# Patient Record
Sex: FEMALE | Race: WHITE | NOT HISPANIC OR LATINO | URBAN - METROPOLITAN AREA
[De-identification: names, ages, dates, MRNs, and addresses within clinical notes are randomized per-mention and may not be internally consistent; named-entity substitution may affect disease eponyms.]

---

## 2023-10-20 PROBLEM — E66.9 OBESITY: Status: ACTIVE | Noted: 2023-10-20

## 2023-10-20 PROBLEM — G47.33 OBSTRUCTIVE SLEEP APNEA: Status: ACTIVE | Noted: 2023-10-20

## 2023-10-20 PROBLEM — R40.0 DAYTIME SOMNOLENCE: Status: ACTIVE | Noted: 2023-10-20

## 2023-10-20 PROBLEM — M62.89 TENSOR FASCIA LATA SYNDROME: Status: ACTIVE | Noted: 2023-10-20

## 2023-10-20 PROBLEM — K21.9 CHRONIC GERD: Status: ACTIVE | Noted: 2023-10-20

## 2023-10-20 PROBLEM — N39.3 URINARY, INCONTINENCE, STRESS FEMALE: Status: ACTIVE | Noted: 2023-10-20

## 2023-10-20 PROBLEM — E55.9 VITAMIN D DEFICIENCY: Status: ACTIVE | Noted: 2023-10-20

## 2023-10-20 PROBLEM — E78.5 HYPERLIPIDEMIA: Status: ACTIVE | Noted: 2023-10-20

## 2023-10-20 PROBLEM — M76.30 ILIOTIBIAL BAND SYNDROME: Status: ACTIVE | Noted: 2023-10-20

## 2023-10-20 RX ORDER — ACETAMINOPHEN, ASPIRIN (NSAID), AND CAFFEINE 250; 250; 65 MG/1; MG/1; MG/1
2 TABLET, FILM COATED ORAL DAILY
COMMUNITY

## 2023-10-20 RX ORDER — OMEPRAZOLE 40 MG/1
1 CAPSULE, DELAYED RELEASE ORAL DAILY
COMMUNITY
Start: 2022-04-13

## 2023-10-20 RX ORDER — CALCIUM CARBONATE 200(500)MG
1 TABLET,CHEWABLE ORAL DAILY
COMMUNITY

## 2023-10-23 ENCOUNTER — OFFICE VISIT (OUTPATIENT)
Dept: OBSTETRICS AND GYNECOLOGY | Facility: HOSPITAL | Age: 38
End: 2023-10-23
Payer: COMMERCIAL

## 2023-10-23 VITALS
BODY MASS INDEX: 41.02 KG/M2 | SYSTOLIC BLOOD PRESSURE: 143 MMHG | DIASTOLIC BLOOD PRESSURE: 96 MMHG | HEIGHT: 71 IN | WEIGHT: 293 LBS

## 2023-10-23 DIAGNOSIS — Z01.419 WELL WOMAN EXAM: ICD-10-CM

## 2023-10-23 DIAGNOSIS — N39.3 STRESS INCONTINENCE: ICD-10-CM

## 2023-10-23 DIAGNOSIS — N39.3 URINARY, INCONTINENCE, STRESS FEMALE: ICD-10-CM

## 2023-10-23 DIAGNOSIS — Z11.51 ENCOUNTER FOR SCREENING FOR HUMAN PAPILLOMAVIRUS (HPV): Primary | ICD-10-CM

## 2023-10-23 DIAGNOSIS — Z12.4 CERVICAL CANCER SCREENING: ICD-10-CM

## 2023-10-23 PROCEDURE — 1036F TOBACCO NON-USER: CPT | Performed by: OBSTETRICS & GYNECOLOGY

## 2023-10-23 PROCEDURE — 99385 PREV VISIT NEW AGE 18-39: CPT | Performed by: OBSTETRICS & GYNECOLOGY

## 2023-10-23 PROCEDURE — 87624 HPV HI-RISK TYP POOLED RSLT: CPT | Performed by: OBSTETRICS & GYNECOLOGY

## 2023-10-23 PROCEDURE — 88175 CYTOPATH C/V AUTO FLUID REDO: CPT | Mod: TC | Performed by: OBSTETRICS & GYNECOLOGY

## 2023-10-23 ASSESSMENT — PATIENT HEALTH QUESTIONNAIRE - PHQ9
2. FEELING DOWN, DEPRESSED OR HOPELESS: NOT AT ALL
1. LITTLE INTEREST OR PLEASURE IN DOING THINGS: NOT AT ALL
SUM OF ALL RESPONSES TO PHQ9 QUESTIONS 1 AND 2: 0

## 2023-10-23 NOTE — PROGRESS NOTES
"HPI: Celeste Philippe is a 38 y.o.  here for an annual physical exam.      OB history:    - CS x1,  x1    Gyn history:   - Menstrual: menses monthly most of the time, skips a period once a year; lighter flow than before having kids, associated pain manageable; last for 5-6 days   - Menarche: 12 years old   - Denies sexual concerns   - Contraception: partner s/p vasectomy   - Declines STI testing  - Endorses stress incontinence, amenable to pelvic floor PT      Screening:  - DV: safe at home   - Exercise, diet: denies concerns   - COVID/flu vaccine: s/p COVID booster, declines flu vaccine today   - Pap: collected today      The patient's past medical, surgical, family and social histories were updated as indicated.    Objective:  Visit Vitals  BP (!) 143/96   Ht 1.803 m (5' 11\")   Wt (!) 168 kg (370 lb)   LMP 10/06/2023   BMI 51.60 kg/m²   OB Status Having periods   Smoking Status Never   BSA 2.9 m²      Physical Exam  Constitutional:       Appearance: Normal appearance.   Genitourinary:      Vulva and urethral meatus normal.      Right Labia: No rash.     Left Labia: No rash.       Right Adnexa: not tender, not full and no mass present.     Left Adnexa: not tender, not full and no mass present.     No cervical motion tenderness or lesion.      Uterus is not tender.   Breasts:     Breasts are symmetrical.      Right: No mass, nipple discharge, skin change or tenderness.      Left: No mass, nipple discharge, skin change or tenderness.   HENT:      Head: Normocephalic and atraumatic.      Nose: Nose normal.   Eyes:      Extraocular Movements: Extraocular movements intact.   Cardiovascular:      Rate and Rhythm: Normal rate.   Pulmonary:      Effort: Pulmonary effort is normal.   Musculoskeletal:         General: Normal range of motion.      Cervical back: Normal range of motion.   Neurological:      General: No focal deficit present.      Mental Status: She is alert.   Skin:     General: Skin is warm and " dry.   Psychiatric:         Behavior: Behavior normal.       ASSESSMENT & PLAN  Celeste Philippe is a 38 y.o.  here for annnual     Urinary, incontinence, stress female  Pelvic floor PT referral made     Well woman exam  Pap collected  Mammogram next year      Orders Placed This Encounter   Procedures    Referral to Physical Therapy     Standing Status:   Future     Standing Expiration Date:   2024     Referral Priority:   Routine     Referral Type:   Consultation     Referral Reason:   Specialty Services Required     Requested Specialty:   Physical Therapy     Number of Visits Requested:   1      RTC in 1 year or PRN    Seen and discussed with Dr. Priscilla Plata MD

## 2023-11-02 ENCOUNTER — OFFICE VISIT (OUTPATIENT)
Dept: PRIMARY CARE | Facility: CLINIC | Age: 38
End: 2023-11-02
Payer: COMMERCIAL

## 2023-11-02 VITALS
SYSTOLIC BLOOD PRESSURE: 139 MMHG | WEIGHT: 293 LBS | HEART RATE: 92 BPM | BODY MASS INDEX: 51.47 KG/M2 | DIASTOLIC BLOOD PRESSURE: 78 MMHG

## 2023-11-02 DIAGNOSIS — R03.0 ELEVATED BLOOD PRESSURE READING: Primary | ICD-10-CM

## 2023-11-02 PROCEDURE — 99213 OFFICE O/P EST LOW 20 MIN: CPT | Performed by: INTERNAL MEDICINE

## 2023-11-02 PROCEDURE — 1036F TOBACCO NON-USER: CPT | Performed by: INTERNAL MEDICINE

## 2023-11-02 NOTE — PROGRESS NOTES
Subjective   Patient ID: Celeste Philippe is a 38 y.o. female who presents for Hypertension.    HPI     Patient is a 38-year-old female with past medical history of obesity who presents with chief complaint of elevated blood pressure readings.  She was at the gynecologist and her systolic blood pressure was 146.  She does not have a history of hypertension.  She does not plan to check her blood pressure at home.  Her blood pressure today is 139/78.  No headaches changes in vision orthopnea.  She does not take any blood pressure medications.  She is for the most part sedentary and does not get much exercise.    Review of Systems  Constitutional: No fever or chills  Cardiovascular: no chest pain, no palpitations and no syncope.   Respiratory: no cough, no shortness of breath during exertion and no shortness of breath at rest.   Gastrointestinal: no abdominal pain, no nausea and no vomiting.  Neuro: No Headache, no dizziness    Objective   /78   Pulse 92   Wt (!) 167 kg (369 lb)   LMP 10/06/2023   BMI 51.47 kg/m²     Physical Exam  Constitutional: Alert and in no acute distress. Well developed, well nourished  Head and Face: Head and face: Normal.    Cardiovascular: Heart rate and rhythm were normal, normal S1 and S2. No peripheral edema.   Pulmonary: No respiratory distress. Clear bilateral breath sounds.  Musculoskeletal: Gait and station: Normal. Muscle strength/tone: Normal.   Skin: Normal skin color and pigmentation, normal skin turgor, and no rash.    Psychiatric: Judgment and insight: Intact. Mood and affect: Normal.        Lab Results   Component Value Date    WBC 7.6 10/28/2021    HGB 14.2 10/28/2021    HCT 45.2 (H) 10/28/2021     10/28/2021    CHOL 203 (H) 10/28/2021    TRIG 148 10/28/2021    HDL 42 (L) 10/28/2021    ALT 16 10/28/2021    AST 14 10/28/2021     10/28/2021    K 4.1 10/28/2021     10/28/2021    CREATININE 0.8 10/28/2021    BUN 7 (L) 10/28/2021    CO2 23 (L) 10/28/2021     TSH 1.94 10/28/2021    INR 0.9 10/28/2021    HGBA1C 5.0 10/28/2021       No image results found.            Assessment/Plan   Problem List Items Addressed This Visit             ICD-10-CM    Elevated blood pressure reading - Primary R03.0

## 2023-11-02 NOTE — ASSESSMENT & PLAN NOTE
While it is likely that she does have hypertension would give it a little bit more time before making an official diagnosis.  Recommend getting an [employer] blood pressure cuff and check it once in the morning daily for 2 weeks.  Please return to clinic in 2 weeks for reevaluation.  Advised regular exercise weight reduction and low-salt diet.  At that time if the blood pressure is still elevated we will need to consider starting a blood pressure medications

## 2023-11-08 LAB
CYTOLOGY CMNT CVX/VAG CYTO-IMP: NORMAL
LAB AP HPV GENOTYPE QUESTION: YES
LAB AP HPV HR: NORMAL
LABORATORY COMMENT REPORT: NORMAL
LMP START DATE: NORMAL
PATH REPORT.TOTAL CANCER: NORMAL

## 2023-11-13 LAB
HPV HR 12 DNA GENITAL QL NAA+PROBE: NEGATIVE
HPV HR GENOTYPES PNL CVX NAA+PROBE: NEGATIVE
HPV16 DNA SPEC QL NAA+PROBE: NEGATIVE
HPV18 DNA SPEC QL NAA+PROBE: NEGATIVE

## 2023-11-16 ENCOUNTER — OFFICE VISIT (OUTPATIENT)
Dept: PRIMARY CARE | Facility: CLINIC | Age: 38
End: 2023-11-16
Payer: COMMERCIAL

## 2023-11-16 VITALS — SYSTOLIC BLOOD PRESSURE: 136 MMHG | DIASTOLIC BLOOD PRESSURE: 84 MMHG | HEART RATE: 94 BPM

## 2023-11-16 DIAGNOSIS — R03.0 ELEVATED BLOOD PRESSURE READING: ICD-10-CM

## 2023-11-16 PROCEDURE — 99213 OFFICE O/P EST LOW 20 MIN: CPT | Performed by: INTERNAL MEDICINE

## 2023-11-16 PROCEDURE — 1036F TOBACCO NON-USER: CPT | Performed by: INTERNAL MEDICINE

## 2023-11-16 NOTE — ASSESSMENT & PLAN NOTE
At this point considering improvement in blood pressure and the fact that the blood pressure at home is less than 130.  We will hold off on starting antihypertensive medications however we will recheck the blood pressure every 6 months.  Advised continue with ambulatory blood pressure monitoring and if blood pressure consistently increases to greater than 130 then please return to clinic for reevaluation.  Encourage weight reduction.

## 2023-11-16 NOTE — PROGRESS NOTES
Subjective   Patient ID: Celeste Philippe is a 38 y.o. female who presents for Follow-up.    HPI     Patient is a 38-year-old female with past medical history of obesity who presents with chief complaint of elevated blood pressure readings.  She was at the gynecologist and her systolic blood pressure was 146.  She does not have a history of hypertension.  She does not plan to check her blood pressure at home.  Her systolic blood pressure is 136.  She is consistently taking her blood pressure at home and its consistently less than 130 systolic.  No headaches changes in vision orthopnea.    Review of Systems  Constitutional: No fever or chills  Cardiovascular: no chest pain, no palpitations and no syncope.   Respiratory: no cough, no shortness of breath during exertion and no shortness of breath at rest.   Gastrointestinal: no abdominal pain, no nausea and no vomiting.  Neuro: No Headache, no dizziness    Objective   /84   Pulse 94   LMP 10/06/2023     Physical Exam  Constitutional: Alert and in no acute distress. Well developed, well nourished  Head and Face: Head and face: Normal.    Cardiovascular: Heart rate and rhythm were normal, normal S1 and S2. No peripheral edema.   Pulmonary: No respiratory distress. Clear bilateral breath sounds.  Musculoskeletal: Gait and station: Normal. Muscle strength/tone: Normal.   Skin: Normal skin color and pigmentation, normal skin turgor, and no rash.    Psychiatric: Judgment and insight: Intact. Mood and affect: Normal.        Lab Results   Component Value Date    WBC 7.6 10/28/2021    HGB 14.2 10/28/2021    HCT 45.2 (H) 10/28/2021     10/28/2021    CHOL 203 (H) 10/28/2021    TRIG 148 10/28/2021    HDL 42 (L) 10/28/2021    ALT 16 10/28/2021    AST 14 10/28/2021     10/28/2021    K 4.1 10/28/2021     10/28/2021    CREATININE 0.8 10/28/2021    BUN 7 (L) 10/28/2021    CO2 23 (L) 10/28/2021    TSH 1.94 10/28/2021    INR 0.9 10/28/2021    HGBA1C 5.0 10/28/2021        No image results found.            Assessment/Plan   Problem List Items Addressed This Visit             ICD-10-CM    Elevated blood pressure reading R03.0     At this point considering improvement in blood pressure and the fact that the blood pressure at home is less than 130.  We will hold off on starting antihypertensive medications however we will recheck the blood pressure every 6 months.  Advised continue with ambulatory blood pressure monitoring and if blood pressure consistently increases to greater than 130 then please return to clinic for reevaluation.  Encourage weight reduction.

## 2024-09-29 ENCOUNTER — OFFICE VISIT (OUTPATIENT)
Dept: URGENT CARE | Age: 39
End: 2024-09-29
Payer: COMMERCIAL

## 2024-09-29 VITALS
DIASTOLIC BLOOD PRESSURE: 76 MMHG | WEIGHT: 293 LBS | TEMPERATURE: 98.5 F | BODY MASS INDEX: 41.02 KG/M2 | OXYGEN SATURATION: 97 % | HEART RATE: 97 BPM | SYSTOLIC BLOOD PRESSURE: 142 MMHG | RESPIRATION RATE: 18 BRPM | HEIGHT: 71 IN

## 2024-09-29 DIAGNOSIS — S23.429A: Primary | ICD-10-CM

## 2024-09-29 PROCEDURE — 99204 OFFICE O/P NEW MOD 45 MIN: CPT | Performed by: PHYSICIAN ASSISTANT

## 2024-09-29 PROCEDURE — 3008F BODY MASS INDEX DOCD: CPT | Performed by: PHYSICIAN ASSISTANT

## 2024-09-29 PROCEDURE — 1036F TOBACCO NON-USER: CPT | Performed by: PHYSICIAN ASSISTANT

## 2024-09-29 RX ORDER — NAPROXEN 500 MG/1
500 TABLET ORAL 2 TIMES DAILY PRN
Qty: 20 TABLET | Refills: 0 | Status: SHIPPED | OUTPATIENT
Start: 2024-09-29 | End: 2024-10-09

## 2024-09-29 ASSESSMENT — PAIN SCALES - GENERAL: PAINLEVEL: 6

## 2024-09-29 NOTE — PROGRESS NOTES
"Subjective   Patient ID: Celeste Philippe is a 39 y.o. female. They present today with a chief complaint of Pain With Breathing (Pain on left side of chest w/ breathing started friday).    History of Present Illness  Reports bending over in the shower 2 days ago, felt a pop in her lower chest, and has had intermittent pain since. Pain is worse with bending forward or twisting, but states he can position herself where she is pain free. Denies SOB, other CP, cough, direct trauma.          Past Medical History  Allergies as of 2024 - Reviewed 2024   Allergen Reaction Noted    Penicillins Hives 10/20/2023       (Not in a hospital admission)       No past medical history on file.    Past Surgical History:   Procedure Laterality Date    OTHER SURGICAL HISTORY  2021    Cholecystectomy    OTHER SURGICAL HISTORY  2021     section        reports that she has never smoked. She has never used smokeless tobacco. She reports that she does not drink alcohol and does not use drugs.    Review of Systems  Pertinent systems reviewed and were negative unless otherwise stated in HPI.    Objective    Vitals:    24 1333   BP: 142/76   BP Location: Left arm   Patient Position: Sitting   BP Cuff Size: Large adult   Pulse: 97   Resp: 18   Temp: 36.9 °C (98.5 °F)   TempSrc: Oral   SpO2: 97%   Weight: (!) 170 kg (375 lb)   Height: 1.803 m (5' 11\")     No LMP recorded.    Physical Exam  Constitutional:       General: She is not in acute distress.  HENT:      Nose: No congestion.      Mouth/Throat:      Mouth: Mucous membranes are moist.   Eyes:      Conjunctiva/sclera: Conjunctivae normal.   Cardiovascular:      Rate and Rhythm: Normal rate and regular rhythm.      Heart sounds: Normal heart sounds.   Pulmonary:      Effort: Pulmonary effort is normal. No respiratory distress.      Breath sounds: Normal breath sounds.   Skin:     Findings: No rash.         Diagnostic study results (if any) were reviewed by " Matias Siddiqui PA-C.    Assessment/Plan   Allergies, medications, history, and pertinent labs/EKGs/imaging reviewed by Matias Siddiqui PA-C.     Medical Decision Making  Low concern for PE, MI, aortic dissection. Clear mechanism of injury, positional, non-exertional. MSK etiology most likely    Orders and Diagnoses  Diagnoses and all orders for this visit:  Sternum sprain, initial encounter      Medical Admin Record      Disposition: Home    Electronically signed by Matias Siddiqui PA-C

## 2025-07-24 ENCOUNTER — APPOINTMENT (OUTPATIENT)
Dept: PRIMARY CARE | Facility: CLINIC | Age: 40
End: 2025-07-24
Payer: COMMERCIAL

## 2025-07-24 VITALS
OXYGEN SATURATION: 97 % | WEIGHT: 293 LBS | HEIGHT: 71 IN | BODY MASS INDEX: 41.02 KG/M2 | SYSTOLIC BLOOD PRESSURE: 138 MMHG | DIASTOLIC BLOOD PRESSURE: 77 MMHG | HEART RATE: 82 BPM

## 2025-07-24 DIAGNOSIS — E66.813 CLASS 3 SEVERE OBESITY WITH BODY MASS INDEX (BMI) OF 50.0 TO 59.9 IN ADULT, UNSPECIFIED OBESITY TYPE, UNSPECIFIED WHETHER SERIOUS COMORBIDITY PRESENT: ICD-10-CM

## 2025-07-24 DIAGNOSIS — Z12.31 SCREENING MAMMOGRAM FOR BREAST CANCER: ICD-10-CM

## 2025-07-24 DIAGNOSIS — Z86.69 HISTORY OF RECURRENT EAR INFECTION: Primary | ICD-10-CM

## 2025-07-24 DIAGNOSIS — Z00.00 HEALTH CARE MAINTENANCE: ICD-10-CM

## 2025-07-24 PROBLEM — R03.0 ELEVATED BLOOD PRESSURE READING: Status: RESOLVED | Noted: 2023-11-02 | Resolved: 2025-07-24

## 2025-07-24 PROBLEM — I10 PRIMARY HYPERTENSION: Status: ACTIVE | Noted: 2025-07-24

## 2025-07-24 PROCEDURE — 3008F BODY MASS INDEX DOCD: CPT | Performed by: INTERNAL MEDICINE

## 2025-07-24 PROCEDURE — 3075F SYST BP GE 130 - 139MM HG: CPT | Performed by: INTERNAL MEDICINE

## 2025-07-24 PROCEDURE — 99396 PREV VISIT EST AGE 40-64: CPT | Performed by: INTERNAL MEDICINE

## 2025-07-24 PROCEDURE — 3078F DIAST BP <80 MM HG: CPT | Performed by: INTERNAL MEDICINE

## 2025-07-24 PROCEDURE — 1036F TOBACCO NON-USER: CPT | Performed by: INTERNAL MEDICINE

## 2025-07-24 ASSESSMENT — PATIENT HEALTH QUESTIONNAIRE - PHQ9
1. LITTLE INTEREST OR PLEASURE IN DOING THINGS: NOT AT ALL
2. FEELING DOWN, DEPRESSED OR HOPELESS: NOT AT ALL
SUM OF ALL RESPONSES TO PHQ9 QUESTIONS 1 AND 2: 0

## 2025-07-24 NOTE — PATIENT INSTRUCTIONS
We kindly ask that you take the lead and scheduling your referral appointments to ensure they align best with your availability by calling [phone].  For laboratory tests we encourage you to schedule an appointment online [email or web address] but walk-ins are available as well.  For radiology testing you can call [phone] or [phone] to schedule.  If for any reason you are having difficulty scheduling your appointments please feel free to reach out at our office by calling [phone] to assist further

## 2025-07-24 NOTE — PROGRESS NOTES
"Subjective   Patient ID: Celeste Philippe is a 40 y.o. female who presents for Annual Exam.          HPI     Patient is a 40-year-old female with past medical history of hypertension and obesity presents for wellness.  No specific complaints at this time other than the obesity.  She has followed with bariatric medicine in the past but was reluctant to do so due to concerns about the surgery.  She continues to be have a BMI of greater than 50.  Her life is mostly sedentary working from home.  She works for [employer].  She has tried exercising but it hurts her knees.  She tries eliminating excessive amounts of calories but no improvement in the weight overall.  She is interested in trying a GLP-1.    Denies illicit substances smoking or alcohol.        Review of Systems  Constitutional: No fever or chills, No Night Sweats  Eyes: No Blurry Vision or Eye sight problems  ENT: No Nasal Discharge, Hoarseness, sore throat  Cardiovascular: no chest pain, no palpitations and no syncope.   Respiratory: no cough, no shortness of breath during exertion and no shortness of breath at rest.   Gastrointestinal: no abdominal pain, no nausea and no vomiting.   : No vaginal discharge, burning with urination, no blood in urine or stools  Skin: No Skin rashes or Lesions  Neuro: No Headache, no dizziness or Numbness or tingling  Psych: No Anxiety, depression or sleeping problems  Heme: No Easy bleeding or brusing.     Objective   /77   Pulse 82   Ht 1.803 m (5' 11\")   Wt (!) 168 kg (370 lb)   SpO2 97%   BMI 51.60 kg/m²     Physical Exam  Constitutional: Alert and in no acute distress. Well developed, well nourished.   Head and Face: Head and face: Normal.    Eyes: Normal external exam. Pupils were equal in size, round, reactive to light (PERRL) with normal accommodation and extraocular movements intact (EOMI).   Ears, Nose, Mouth, and Throat: External inspection of ears and nose: Normal.  Hearing: Normal.  Nasal mucosa, " septum, and turbinates: Normal.  Lips, teeth, and gums: Normal.  Oropharynx: Normal.   Neck: No neck mass was observed. Supple. Thyroid not enlarged and there were no palpable thyroid nodules.   Cardiovascular: Heart rate and rhythm were normal, normal S1 and S2. Pedal pulses: Normal. No peripheral edema.   Pulmonary: No respiratory distress. Clear bilateral breath sounds.   Breast: Normal Appearance, No Masses or lumps palpated  Abdomen: Soft nontender; no abdominal mass palpated. Normal bowel sounds. No organomegaly.   : Deferred   Musculoskeletal: No joint swelling seen, normal movements of all extremities. Range of motion: Normal.  Muscle strength/tone: Normal.    Skin: Normal skin color and pigmentation, normal skin turgor, and no rash.   Neurologic: Deep tendon reflexes were 2+ and symmetric.   Psychiatric: Judgment and insight: Intact. Mood and affect: Normal.  Lymphatic: No cervical lymphadenopathy. Palpation of lymph nodes in axillae: Normal.  Palpation of lymph nodes in groin: Normal.    Lab Results   Component Value Date    WBC 7.6 10/28/2021    HGB 14.2 10/28/2021    HCT 45.2 (H) 10/28/2021     10/28/2021    CHOL 203 (H) 10/28/2021    TRIG 148 10/28/2021    HDL 42 (L) 10/28/2021    ALT 16 10/28/2021    AST 14 10/28/2021     10/28/2021    K 4.1 10/28/2021     10/28/2021    CREATININE 0.8 10/28/2021    BUN 7 (L) 10/28/2021    CO2 23 (L) 10/28/2021    TSH 1.94 10/28/2021    INR 0.9 10/28/2021    HGBA1C 5.0 10/28/2021       No image results found.      Assessment/Plan   Problem List Items Addressed This Visit           ICD-10-CM    Obesity E66.9    Relevant Medications    tirzepatide, weight loss, (Zepbound) 2.5 mg/0.5 mL injection    Other Relevant Orders    Referral to Endocrinology     Other Visit Diagnoses         Codes      History of recurrent ear infection    -  Primary Z86.69    Relevant Orders    Referral to ENT      Health care maintenance     Z00.00    Relevant Orders    CBC     Comprehensive metabolic panel    Lipid Panel    Lipoprotein A (LPA)    TSH with reflex to Free T4 if abnormal          Given the significant obesity likely impairing her ability to exercise we will see if GLP-1 is covered.  Will trial Zepbound.  Follow-up 3 months.  May need prior authorization.  Consider bariatric surgery    Referral to ENT given recurrent ear infections over the last year.  Check screening labs  Mammogram ordered    Dear Celeste Philippe     It was my pleasure to take care of you today in the office. Below are the things we discussed today:    1. 1. Immunizations: Yearly Flu shot is recommended.       2. Blood Work: Ordered  3. Seen your dentist twice a year  4. Yearly Eye exam is recommended    5. BMI: 51.6  6: Diet recommendations:   Eat Clean, Try to have as many home cooked meals as possible  Avoid processed foods which contain excess calories, sugar, and sodium.    7. Exercise recommendations:   150 minutes a week to maintain your weight     If you have to loose weight, you need a better diet and exercise plan.     8. Supplements recommended:  a - Calcium 600 mg up to twice a day to get a total of 1200 mg. Each 8 oz of milk or yogurt or 1 oz of cheese, 1 Banana, 1 serving of green Leafy vegetable has about 300 mg of Calcium, so you may subtract that amount. Calcium citrate is the only acceptable supplement to take if you take an acid suppressing medication like Prilosec; otherwise Calcium carbonate is acceptable too (It can cause Constipation).   b - Vitamin D - 2000 IU daily     9. Please get your Living will / Advance directive completed if you do not have one already. Please make sure our office has a copy of the latest one.     10. Colonoscopy: Uptodate, repeat in   11. Mammogram: Start at age  12. PAP: Follow-up with gynecology  13. DEXA: N/A  14: Skin Check: Please see Dermatology once a year for a Skin Check.     15.  Follow-up 3 months    Follow up in one year for a Physical. Please  call the office before your Physical to see if you need blood work completed prior to your physical.     Please call me if any questions arise from now until your next visit. I will call you after I am done seeing patients. A Doctor is always available by phone when the office is closed. Please feel free to call for help with any problem that you feel shouldn't wait until the office re-opens.

## 2025-07-25 RX ORDER — TIRZEPATIDE 2.5 MG/.5ML
2.5 INJECTION, SOLUTION SUBCUTANEOUS
Qty: 2 ML | Refills: 0 | Status: SHIPPED | OUTPATIENT
Start: 2025-07-27

## 2025-07-27 ENCOUNTER — APPOINTMENT (OUTPATIENT)
Dept: RADIOLOGY | Facility: HOSPITAL | Age: 40
End: 2025-07-27
Payer: COMMERCIAL

## 2025-07-27 ENCOUNTER — HOSPITAL ENCOUNTER (EMERGENCY)
Facility: HOSPITAL | Age: 40
Discharge: HOME | End: 2025-07-27
Payer: COMMERCIAL

## 2025-07-27 VITALS
BODY MASS INDEX: 41.02 KG/M2 | HEIGHT: 71 IN | RESPIRATION RATE: 18 BRPM | WEIGHT: 293 LBS | HEART RATE: 90 BPM | TEMPERATURE: 96.8 F | SYSTOLIC BLOOD PRESSURE: 146 MMHG | DIASTOLIC BLOOD PRESSURE: 86 MMHG | OXYGEN SATURATION: 95 %

## 2025-07-27 DIAGNOSIS — K64.8 INTERNAL HEMORRHOID: Primary | ICD-10-CM

## 2025-07-27 DIAGNOSIS — K56.41 FECAL IMPACTION (MULTI): ICD-10-CM

## 2025-07-27 PROCEDURE — 2500000001 HC RX 250 WO HCPCS SELF ADMINISTERED DRUGS (ALT 637 FOR MEDICARE OP): Performed by: PHYSICIAN ASSISTANT

## 2025-07-27 PROCEDURE — 74019 RADEX ABDOMEN 2 VIEWS: CPT | Mod: FOREIGN READ | Performed by: RADIOLOGY

## 2025-07-27 PROCEDURE — 74018 RADEX ABDOMEN 1 VIEW: CPT

## 2025-07-27 PROCEDURE — 2500000005 HC RX 250 GENERAL PHARMACY W/O HCPCS: Performed by: PHYSICIAN ASSISTANT

## 2025-07-27 PROCEDURE — 99283 EMERGENCY DEPT VISIT LOW MDM: CPT

## 2025-07-27 RX ORDER — LIDOCAINE 50 MG/G
OINTMENT TOPICAL AS NEEDED
Qty: 30 G | Refills: 0 | Status: SHIPPED | OUTPATIENT
Start: 2025-07-27 | End: 2026-07-27

## 2025-07-27 RX ORDER — LIDOCAINE 40 MG/G
CREAM TOPICAL ONCE
Status: COMPLETED | OUTPATIENT
Start: 2025-07-27 | End: 2025-07-27

## 2025-07-27 RX ORDER — HYDROCORTISONE ACETATE 25 MG/1
25 SUPPOSITORY RECTAL ONCE
Status: COMPLETED | OUTPATIENT
Start: 2025-07-27 | End: 2025-07-27

## 2025-07-27 RX ORDER — IBUPROFEN 600 MG/1
600 TABLET, FILM COATED ORAL EVERY 6 HOURS PRN
Qty: 28 TABLET | Refills: 0 | Status: SHIPPED | OUTPATIENT
Start: 2025-07-27 | End: 2025-08-03

## 2025-07-27 RX ORDER — DOCUSATE SODIUM 100 MG/1
100 CAPSULE, LIQUID FILLED ORAL ONCE
Status: COMPLETED | OUTPATIENT
Start: 2025-07-27 | End: 2025-07-27

## 2025-07-27 RX ORDER — POLYETHYLENE GLYCOL 3350 17 G/17G
17 POWDER, FOR SOLUTION ORAL DAILY
Qty: 500 G | Refills: 0 | Status: SHIPPED | OUTPATIENT
Start: 2025-07-27 | End: 2025-08-26

## 2025-07-27 RX ORDER — HYDROCORTISONE ACETATE 25 MG/1
25 SUPPOSITORY RECTAL 2 TIMES DAILY
Qty: 12 SUPPOSITORY | Refills: 0 | Status: SHIPPED | OUTPATIENT
Start: 2025-07-27 | End: 2025-08-02

## 2025-07-27 RX ORDER — IBUPROFEN 600 MG/1
600 TABLET, FILM COATED ORAL ONCE
Status: COMPLETED | OUTPATIENT
Start: 2025-07-27 | End: 2025-07-27

## 2025-07-27 RX ADMIN — DOCUSATE SODIUM 100 MG: 100 CAPSULE, LIQUID FILLED ORAL at 15:01

## 2025-07-27 RX ADMIN — LIDOCAINE 1 APPLICATION: 40 CREAM TOPICAL at 15:01

## 2025-07-27 RX ADMIN — HYDROCORTISONE ACETATE 25 MG: 25 SUPPOSITORY RECTAL at 15:01

## 2025-07-27 RX ADMIN — IBUPROFEN 600 MG: 600 TABLET ORAL at 15:01

## 2025-07-27 ASSESSMENT — PAIN DESCRIPTION - LOCATION: LOCATION: RECTUM

## 2025-07-27 ASSESSMENT — PAIN SCALES - GENERAL: PAINLEVEL_OUTOF10: 8

## 2025-07-27 ASSESSMENT — PAIN - FUNCTIONAL ASSESSMENT: PAIN_FUNCTIONAL_ASSESSMENT: 0-10

## 2025-07-27 ASSESSMENT — PAIN DESCRIPTION - DESCRIPTORS: DESCRIPTORS: THROBBING;PRESSURE

## 2025-07-27 ASSESSMENT — PAIN DESCRIPTION - PAIN TYPE: TYPE: ACUTE PAIN

## 2025-07-27 NOTE — ED TRIAGE NOTES
"Pt C/O rectal pain x 2 days. Pt states \"it feels like I'm trying to push out a golf ball but there is no golf ball\"  "

## 2025-07-27 NOTE — ED PROVIDER NOTES
HPI   Chief Complaint   Patient presents with    Rectal Pain       40-year-old female complains of pain in her rectum and states it feels like she is trying to push something out.  She denies any injury she denies history of hemorrhoids.  She had a hard stool day prior but has had some diarrhea type stool since.              Patient History   Medical History[1]  Surgical History[2]  Family History[3]  Social History[4]    Physical Exam   ED Triage Vitals [07/27/25 1345]   Temperature Heart Rate Respirations BP   36 °C (96.8 °F) 90 18 146/86      Pulse Ox Temp Source Heart Rate Source Patient Position   95 % Temporal -- --      BP Location FiO2 (%)     -- --       Physical Exam  Vitals reviewed. Exam conducted with a chaperone present (Meghan RN).   Constitutional:       General: She is not in acute distress.     Appearance: Normal appearance. She is normal weight. She is not ill-appearing, toxic-appearing or diaphoretic.   HENT:      Head: Normocephalic and atraumatic.      Right Ear: External ear normal.      Left Ear: External ear normal.      Nose: Nose normal.      Mouth/Throat:      Mouth: Mucous membranes are moist.     Eyes:      Extraocular Movements: Extraocular movements intact.      Conjunctiva/sclera: Conjunctivae normal.      Pupils: Pupils are equal, round, and reactive to light.       Cardiovascular:      Rate and Rhythm: Normal rate and regular rhythm.   Pulmonary:      Effort: Pulmonary effort is normal. No respiratory distress.      Breath sounds: No stridor.   Abdominal:      General: There is no distension.   Genitourinary:     Rectum: Normal.      Comments: Internal rectal exam with tenderness. Normal externally. Suspect fecal impaction and internal hemorrhoids since pain and swelling on sides of rectum but also hard stool in rectal vault.     Musculoskeletal:         General: No swelling or deformity.     Skin:     Capillary Refill: Capillary refill takes less than 2 seconds.      Findings:  No rash.     Neurological:      General: No focal deficit present.      Mental Status: She is alert and oriented to person, place, and time. Mental status is at baseline.     Psychiatric:         Mood and Affect: Mood normal.         Behavior: Behavior normal.         Thought Content: Thought content normal.         Judgment: Judgment normal.           ED Course & MDM   Diagnoses as of 25 1626   Internal hemorrhoid   Fecal impaction (Multi)                 No data recorded     Jamarcus Coma Scale Score: 15 (25 1505 : Karely Gonzales RN)                           Medical Decision Making  Differential diagnosis of fissures or hemorrhoid prolapse fecal impaction    My suspicion is or is both internal hemorrhoids and a fecal impaction I did obtain a x-ray that does show significant amount of stool in the rectal vault interpreted by me with no signs of bowel obstruction.    Given multiple medications for both hemorrhoid and to soften the stool as well as she can do a home Fleet enema referred to colorectal recommend to follow-up for any worse or concerning symptoms ultimately discharge    Risk  OTC drugs.  Prescription drug management.        Procedure  Procedures         [1] No past medical history on file.  [2]   Past Surgical History:  Procedure Laterality Date    OTHER SURGICAL HISTORY  2021    Cholecystectomy    OTHER SURGICAL HISTORY  2021     section   [3]   Family History  Problem Relation Name Age of Onset    Atrial fibrillation Father      Coronary artery disease Father      Transient ischemic attack Father     [4]   Social History  Tobacco Use    Smoking status: Never    Smokeless tobacco: Never   Vaping Use    Vaping status: Never Used   Substance Use Topics    Alcohol use: Never    Drug use: Never        Osbaldo Chu PA-C  [phone]

## 2025-07-28 ENCOUNTER — APPOINTMENT (OUTPATIENT)
Dept: SURGERY | Facility: CLINIC | Age: 40
End: 2025-07-28
Payer: COMMERCIAL

## 2025-07-28 ENCOUNTER — TELEPHONE (OUTPATIENT)
Dept: SURGERY | Facility: CLINIC | Age: 40
End: 2025-07-28
Payer: COMMERCIAL

## 2025-07-28 DIAGNOSIS — E66.813 CLASS 3 SEVERE OBESITY WITH BODY MASS INDEX (BMI) OF 50.0 TO 59.9 IN ADULT, UNSPECIFIED OBESITY TYPE, UNSPECIFIED WHETHER SERIOUS COMORBIDITY PRESENT: ICD-10-CM

## 2025-07-28 RX ORDER — TIRZEPATIDE 2.5 MG/.5ML
2.5 INJECTION, SOLUTION SUBCUTANEOUS
Qty: 2 ML | Refills: 0 | Status: SHIPPED | OUTPATIENT
Start: 2025-08-03

## 2025-07-28 NOTE — TELEPHONE ENCOUNTER
Left message for patient to return call regarding scheduling appointment with Dr. Jefferson. I have blocked off 8/21 at 11:30.

## 2025-08-01 ENCOUNTER — OFFICE VISIT (OUTPATIENT)
Dept: SURGERY | Facility: CLINIC | Age: 40
End: 2025-08-01
Payer: COMMERCIAL

## 2025-08-01 VITALS
DIASTOLIC BLOOD PRESSURE: 82 MMHG | HEART RATE: 75 BPM | BODY MASS INDEX: 51.74 KG/M2 | WEIGHT: 293 LBS | SYSTOLIC BLOOD PRESSURE: 120 MMHG

## 2025-08-01 DIAGNOSIS — K64.8 PROLAPSED INTERNAL HEMORRHOIDS: Primary | ICD-10-CM

## 2025-08-01 PROCEDURE — 99213 OFFICE O/P EST LOW 20 MIN: CPT | Mod: 25 | Performed by: NURSE PRACTITIONER

## 2025-08-01 PROCEDURE — 46600 DIAGNOSTIC ANOSCOPY SPX: CPT | Performed by: NURSE PRACTITIONER

## 2025-08-01 NOTE — PROGRESS NOTES
History Of Present Illness  Celeste Philippe is a 40 y.o. female presenting with large hemorrhoids.     She had constipation for 2 days and went to the ED d/t the anal pain.  They felt a large internal hemorrhoid that was sore and it still is a little sore at times.   She has been using Hydrocortisone suppositories daily and that is helping with the discomfort.    She will have 1 soft-loose BM's every day or she can miss a few days.  She is not taking any fiber supplements.  No c/o any rectal bleeding.  She has had 1 vaginal birth with a tear.  No c/o any accidents or leakage of stool.      No colonoscopy or any perianal surgeries.      No family hx of colorectal cancer    Past Medical History  She has no past medical history on file.    Surgical History  She has a past surgical history that includes Other surgical history (04/23/2021); Other surgical history (04/23/2021); and Cholecystectomy (03/08/2008).     Social History  She reports that she has never smoked. She has never used smokeless tobacco. She reports that she does not drink alcohol and does not use drugs.    Family History  Family History[1]     Allergies  Penicillins    Review of Systems   All other systems reviewed and are negative.       Physical Exam  Exam conducted with a chaperone present.   Constitutional:       Appearance: Normal appearance.   HENT:      Head: Normocephalic and atraumatic.   Pulmonary:      Effort: Pulmonary effort is normal.     Musculoskeletal:         General: Normal range of motion.     Skin:     General: Skin is warm and dry.     Neurological:      General: No focal deficit present.      Mental Status: She is alert and oriented to person, place, and time.     Psychiatric:         Mood and Affect: Mood normal.         Behavior: Behavior normal.       Anoscopy    Date/Time: 8/1/2025 10:13 AM    Performed by: DEANDRE Maldonado  Authorized by: DEANDRE Maldonado    Consent:     Consent obtained:  Verbal    Consent  given by:  Patient    Risks, benefits, and alternatives were discussed: yes    Universal protocol:     Procedure explained and questions answered to patient or proxy's satisfaction: yes      Patient identity confirmed:  Verbally with patient  Post-procedure details:     Procedure completion:  Tolerated  Comments:      No external hemorrhoids.  You can feel the large internal hemorrhoids on [address].  No pain.  On anoscopy, looking in 360 degrees, she has large prolapsing and inflamed internal hemorrhoids.  No active bleeding.         Assessment/Plan   Celeste has large prolapsing internal hemorrhoids.  She was unable to have the rubber band ligation today and will do that next week.  She will start taking Metamucil daily to keep her stools soft and bulked.  She will follow up next week for the banding.       Temitope Meza, APRN-CNP       [1]   Family History  Problem Relation Name Age of Onset    Atrial fibrillation Father      Coronary artery disease Father      Transient ischemic attack Father

## 2025-08-08 ENCOUNTER — OFFICE VISIT (OUTPATIENT)
Dept: SURGERY | Facility: CLINIC | Age: 40
End: 2025-08-08
Payer: COMMERCIAL

## 2025-08-08 VITALS — HEART RATE: 87 BPM | DIASTOLIC BLOOD PRESSURE: 83 MMHG | SYSTOLIC BLOOD PRESSURE: 138 MMHG

## 2025-08-08 DIAGNOSIS — K64.8 PROLAPSED INTERNAL HEMORRHOIDS: Primary | ICD-10-CM

## 2025-08-08 PROCEDURE — 46221 LIGATION OF HEMORRHOID(S): CPT | Performed by: NURSE PRACTITIONER

## 2025-08-08 PROCEDURE — 99212 OFFICE O/P EST SF 10 MIN: CPT | Performed by: NURSE PRACTITIONER

## 2025-08-08 NOTE — PROGRESS NOTES
History Of Present Illness  Celeste Philippe is a 40 y.o. female who is here for a rubber band ligation.    She was seen last week but was unable to have the banding until today.       Past Medical History  She has no past medical history on file.    Surgical History  She has a past surgical history that includes Other surgical history (04/23/2021); Other surgical history (04/23/2021); and Cholecystectomy (03/08/2008).     Social History  She reports that she has never smoked. She has never used smokeless tobacco. She reports that she does not drink alcohol and does not use drugs.    Family History  Family History[1]     Allergies  Penicillins    Review of Systems   All other systems reviewed and are negative.       Physical Exam  Exam conducted with a chaperone present.   Constitutional:       Appearance: Normal appearance.   HENT:      Head: Normocephalic and atraumatic.   Pulmonary:      Effort: Pulmonary effort is normal.     Musculoskeletal:         General: Normal range of motion.     Skin:     General: Skin is warm and dry.     Neurological:      General: No focal deficit present.      Mental Status: She is alert and oriented to person, place, and time.     Psychiatric:         Mood and Affect: Mood normal.         Behavior: Behavior normal.          General    Date/Time: 8/8/2025 9:19 AM    Performed by: DEANDRE Maldonado  Authorized by: DEANDRE Maldonado    Consent:     Consent obtained:  Written    Consent given by:  Patient    Risks discussed:  Bleeding, infection and pain  Sedation:     Sedation type:  None  Anesthesia:     Anesthesia method:  None  Procedure specific details:      No external hemorrhoids.  On anoscopy, looking in 360 degrees, she has large prolapsing internal hemorrhoids.  I was able to place double rubber bands over the hemorrhoids in the right posterior and anterior and left lateral position.  Post-procedure details:     Procedure completion:  Tolerated    Last Recorded  Vitals  /83   Pulse 87      Assessment/Plan   Celeste tolerated the rubber band ligation well today.  I was able to place double rubber bands over the hemorrhoids in the right posterior and anterior and left lateral positions.  She will continue to increase her fiber and water intake.  She will call with any issues and will follow up in 6 weeks if not better.         Temitope Meza, APRN-CNP       [1]   Family History  Problem Relation Name Age of Onset    Atrial fibrillation Father      Coronary artery disease Father      Transient ischemic attack Father

## 2025-08-11 ENCOUNTER — HOSPITAL ENCOUNTER (OUTPATIENT)
Dept: RADIOLOGY | Facility: CLINIC | Age: 40
Discharge: HOME | End: 2025-08-11
Payer: COMMERCIAL

## 2025-08-11 DIAGNOSIS — Z12.31 SCREENING MAMMOGRAM FOR BREAST CANCER: ICD-10-CM

## 2025-09-22 ENCOUNTER — APPOINTMENT (OUTPATIENT)
Dept: SURGERY | Facility: CLINIC | Age: 40
End: 2025-09-22
Payer: COMMERCIAL